# Patient Record
Sex: FEMALE | Race: WHITE | Employment: FULL TIME | URBAN - METROPOLITAN AREA
[De-identification: names, ages, dates, MRNs, and addresses within clinical notes are randomized per-mention and may not be internally consistent; named-entity substitution may affect disease eponyms.]

---

## 2017-05-23 ENCOUNTER — HOSPITAL ENCOUNTER (OUTPATIENT)
Dept: PHYSICAL THERAPY | Age: 22
Discharge: HOME | End: 2017-05-23
Payer: COMMERCIAL

## 2017-05-23 PROCEDURE — 97162 PT EVAL MOD COMPLEX 30 MIN: CPT

## 2017-05-23 PROCEDURE — 97530 THERAPEUTIC ACTIVITIES: CPT

## 2017-05-23 NOTE — PROGRESS NOTES
PT DAILY TREATMENT NOTE     Patient Name: Isma Thrasher  Date:2017  : 1995  [x]  Patient  Verified  Payor: Dale Amanda / Plan: [place] [employer]  [facility] Type: Commerical /    In time: 5:15pm  Out time: 6:02 pm  Total Treatment Time (min): 47  Visit #: 1 of 12    Treatment Area: Sprain of calcaneofibular ligament of right ankle, initial encounter [S93.518A]    SUBJECTIVE  Pain Level (0-10 scale): 0  Any medication changes, allergies to medications, adverse drug reactions, diagnosis change, or new procedure performed?: [x] No    [] Yes (see summary sheet for update)  Subjective functional status/changes:   [] No changes reported    Hx Present Illness: \"Alcohol, 4 steps and a floor length dress, not a good combination. \"  Tripped and fell down the steps  DOI 17  Right ankle sprain, Left foot maira fx  Currently wearing boot on left foot for Miara fx  Went to ER day after - x-rays on right ankle - aircast and crutches x4 days  Went to [employer] [facility] x 5 days - with left foot undiagnosed  Has hx of 3 ankle sprains on right ankle  Currently hoping for non-surgical treatment for Left foot Maira fx   Increase in pain with descending stairs, after walking >1 hour, lifting, turning with lifting   Reports that has \"shooting pain\" into lateral calf at the end of the day  PLOF  Unrestricted and unlimited in daily, work and recreational activities   X-rays     Pain:  _9__/10 max       __0_/10 min     _0___/10 now    Location: indicates on right foot and ankle - anterior lateral ankle      [] Sharp    [] Dull      [] Burning     []  Aching     [] Throbbing      [] Tingling     [x] Other: \"shooting\"       []  Constant                   [x] Intermittent        Previous treatment:   Crutches x4 days, air cast, over the counter brace    PMHX: Significant for: please see past medical hx from     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 \"      OBJECTIVE    Modality rationale:    Min Type Additional Details    [] Estim:  []Unatt       []IFC  []Premod                        []Other:  []w/ice   []w/heat  Position:  Location:    [] Estim: []Att    []TENS instruct  []NMES                    []Other:  []w/US   []w/ice   []w/heat  Position:  Location:    []  Traction: [] Cervical       []Lumbar                       [] Prone          []Supine                       []Intermittent   []Continuous Lbs:  [] before manual  [] after manual    []  Ultrasound: []Continuous   [] Pulsed                           []1MHz   []3MHz W/cm2:  Location:    []  Iontophoresis with dexamethasone         Location: [] Take home patch   [] In clinic    []  Ice     []  heat  []  Ice massage  []  Laser   []  Anodyne Position:  Location:    []  Laser with stim  []  Other:  Position:  Location:    []  Vasopneumatic Device Pressure:       [] lo [] med [] hi   Temperature: [] lo [] med [] hi   [] Skin assessment post-treatment:  []intact []redness- no adverse reaction    []redness - adverse reaction:     35 min [x]Eval                  []Re-Eval             With   [] TE   [x] TA - 12 min   [] neuro   [] other: Patient Education: [x] Review HEP    [] Progressed/Changed HEP based on:   [] positioning   [] body mechanics   [] transfers   [] heat/ice application    [x] other:  Reviewed exam findings, anatomy involved and POC     Other Objective/Functional Measures:    Movement/gait:      Visual Inspection: Thoracic: flattened  Lumbar: WFL, slightly increased   Shoulder/Scapula: left shoulder lower, bilateral scap abducted    Palpation: TTP along right ATFL, CFL, peroneals, anterior ankle, along anterior tib muscle belly   Active trigger points in right medial gastroc and soleus  TTP along achilles                           AROM/PROM Right Left   DF 5* Lacking 1*   PF WFL WFL   IV 25 WFL   EV 18 WFL Strength Right Left   DF 5 NT due to fx   PF  NT due to fx   IV p! NT due to fx   EV 4, p! NT due to fx                 Special Tests Right Left   Anterior Drawer + -   Talar Tilt + -                         Other Right Left   Girth - malleoli 26 cm 25 cm   Figure 8 51.1 cm 51 cm                         Other Comments: Standing [place] Flexion: fingers to floor, no reversal of lordosis,no use of gluts to stand  Gillet: hypomobile bilaterally     Pain Level (0-10 scale) post treatment: 3    ASSESSMENT/Changes in Function:   Pt is a 24 y.o. Female with C/C of right ankle pain. Pt is S/P right ankle inversion sprain with DOI on 4/22/17. At same time pt also sustained a left Emanuel fx. Pt presents to PT with walking boot on left foot and over the counter brace on right ankle. Objective findings include decreased ROM, mild swelling at right ankle, TTP along anterior TC joint, ATFL and peroneal/fibularis muscle bellies and tendonous junction. Pain with talar tilt and anterior drawer test and possible laxity with anterior drawer. Pt could benefit from skilled PT to address proprioception, stability, strength and decrease risk of future reinjury. Patient will continue to benefit from skilled PT services to modify and progress therapeutic interventions, address functional mobility deficits, address ROM deficits, address strength deficits, analyze and address soft tissue restrictions, analyze and cue movement patterns, analyze and modify body mechanics/ergonomics, assess and modify postural abnormalities and instruct in home and community integration to attain remaining goals. [x]  See Plan of Care  []  See progress note/recertification  []  See Discharge Summary         Progress towards goals / Updated goals:  Short Term Goals: STG- To be accomplished in 2 week(s):  1. Pt will be independent with HEP to encourage prophylaxis.   Eval:dispensed  Current: NA    Long Term Goals: LTG- To be accomplished in 6 week(s):  1. Pt will increase right foot and ankle DF AROM to [facility] to facilitate proper gait mechanics. Eval:5*  Current: NA    2. Pt will be able to perform dynamic SL stance activity for 30 sec without pain or error to indicate improved stability. Eval:unable  Current: NA    3. Pt will increase right foot and ankle IV, EV and PF strength to >4/5 MMT to allow pt to perform age appropriate and daily activities   Eval: pain with MMT  Current: NA    4. Pt FOTO score will increase by 12 points to show improvement in function. Eval:53  Current: will address at visit 5      PLAN  [x]  Upgrade activities as tolerated     []  Continue plan of care  []  Update interventions per flow sheet       []  Discharge due to:_  []  Other:_      Weston Coleman, PT, DPT 5/23/2017  5:18 PM    No future appointments.

## 2017-05-23 NOTE — PROGRESS NOTES
[facility] at the [address], [place], [address] [facility]  Phone: [phone]      Fax:  [phone]       Plan of Care/ Statement of Necessity for Physical Therapy Services      Patient name: Conchita Healy Start of Care: 2017   Referral source: Chhaya Cam MD : 1995    Medical Diagnosis: Sprain of calcaneofibular ligament of right ankle, initial encounter [S93.411A]   Onset Date:DOI 17    Treatment Diagnosis:  Right Ankle Sprain   Prior Hospitalization: see medical history Provider#: [id number]   Medications: Verified on Patient summary List    Comorbidities: Fracture Right Foot, HTN, BMI >30   Prior Level of Function: Unrestricted and unlimited in daily, work and recreational activities       The [address] [place] [address] and following information is based on the information from the initial evaluation. Assessment/ key information:   Pt is a 24 y.o. Female with C/C of right ankle pain. Pt is S/P right ankle inversion sprain with DOI on 17. At same time pt also sustained a left Emanuel fx. Pt presents to PT with walking boot on left foot and over the counter brace on right ankle. Objective findings include decreased ROM, mild swelling at right ankle, TTP along anterior TC joint, ATFL and peroneal/fibularis muscle bellies and tendonous junction. Pain with talar tilt and anterior drawer test and possible laxity with anterior drawer. Pt could benefit from skilled PT to address proprioception, stability, strength and decrease risk of future reinjury. Evaluation Complexity History HIGH Complexity :3+ comorbidities / personal factors will impact the outcome/ POC ; Examination HIGH Complexity : 4+ Standardized tests and measures addressing body structure, function, activity limitation and / or participation in recreation  ;Presentation MEDIUM Complexity : Evolving with changing characteristics  ; Clinical Decision Making MEDIUM Complexity : FOTO score of 26-74  Overall Complexity Rating: MEDIUM  Problem List: pain affecting function, decrease ROM, decrease strength, impaired gait/ balance, decrease ADL/ functional abilitiies, decrease activity tolerance and decrease flexibility/ joint mobility   Treatment Plan may include any combination of the following: Therapeutic exercise, Therapeutic activities, Neuromuscular re-education, Physical agent/modality, Gait/balance training, Manual therapy and Patient education  Patient / Family readiness to learn indicated by: asking questions, trying to perform skills and interest  Persons(s) to be included in education: patient (P)  Barriers to Learning/Limitations: None  Patient Goal (s): My ankle to not be weak and for me to not end up having surgery  Patient Self Reported Health Status: good  Rehabilitation Potential: good    Short Term Goals: STG- To be accomplished in 2 week(s):  1. Pt will be independent with HEP to encourage prophylaxis. Eval:dispensed  Current: NA     Long Term Goals: LTG- To be accomplished in 6 week(s):  1. Pt will increase right foot and ankle DF AROM to [facility] to facilitate proper gait mechanics. Eval:5*  Current: NA     2. Pt will be able to perform dynamic SL stance activity for 30 sec without pain or error to indicate improved stability. Eval:unable  Current: NA     3. Pt will increase right foot and ankle IV, EV and PF strength to >4/5 MMT to allow pt to perform age appropriate and daily activities   Eval: pain with MMT  Current: NA     4. Pt FOTO score will increase by 12 points to show improvement in function. Eval:53  Current: will address at visit 5    Frequency / Duration: Patient to be seen 2 times per week for 6 weeks.     Patient/ Caregiver education and instruction: Diagnosis, prognosis, self care, activity modification and exercises   [x]  Plan of care has been reviewed with CJ Wilde, PT, DPT 5/23/2017 7:17 PM  _____________________________________________________________________  I certify that the above Therapy Services are being furnished while the patient is under my care. I agree with the treatment plan and certify that this therapy is necessary.     Physician's Signature:____________________  Date:__________Time:______    Please sign and return to [facility] at the [address] [place], [address] [facility]       Phone: [phone]      Fax:  [phone]

## 2017-05-25 ENCOUNTER — HOSPITAL ENCOUNTER (OUTPATIENT)
Dept: PHYSICAL THERAPY | Age: 22
Discharge: HOME | End: 2017-05-25
Payer: COMMERCIAL

## 2017-05-25 PROCEDURE — 97530 THERAPEUTIC ACTIVITIES: CPT

## 2017-05-25 PROCEDURE — 97016 VASOPNEUMATIC DEVICE THERAPY: CPT

## 2017-05-25 PROCEDURE — 97110 THERAPEUTIC EXERCISES: CPT

## 2017-05-25 PROCEDURE — 97112 NEUROMUSCULAR REEDUCATION: CPT

## 2017-05-25 NOTE — PROGRESS NOTES
PT DAILY TREATMENT NOTE     Patient Name: Conchita Healy  Date:2017  : 1995  [x]  Patient  Verified  Payor: Vanna Benjamin / Plan: [place] [employer]  [facility] PT / Product Type: Commerical /    In time:6:02  Out time:7:13  Total Treatment Time (min): 71  Visit #: 2 of 12    Treatment Area: Sprain of calcaneofibular ligament of right ankle, initial encounter [S90.287Q]    SUBJECTIVE  Pain Level (0-10 scale): 2/10  Any medication changes, allergies to medications, adverse drug reactions, diagnosis change, or new procedure performed?: [x] No    [] Yes (see summary sheet for update)  Subjective functional status/changes:   [] No changes reported  \"I have pain in my ankle. It doesn't hurt bad when I'm just standing on it. \"    OBJECTIVE    Modality rationale: decrease edema, decrease inflammation and decrease pain to improve the patients ability to perform ADLs with less pain.     Min Type Additional Details    [] Estim:  []Unatt       []IFC  []Premod                        []Other:  []w/ice   []w/heat  Position:  Location:    [] Estim: []Att    []TENS instruct  []NMES                    []Other:  []w/US   []w/ice   []w/heat  Position:  Location:    []  Traction: [] Cervical       []Lumbar                       [] Prone          []Supine                       []Intermittent   []Continuous Lbs:  [] before manual  [] after manual    []  Ultrasound: []Continuous   [] Pulsed                           []1MHz   []3MHz W/cm2:  Location:    []  Iontophoresis with dexamethasone         Location: [] Take home patch   [] In clinic    []  Ice     []  heat  []  Ice massage  []  Laser   []  Anodyne Position:  Location:    []  Laser with stim  []  Other:  Position:  Location:   10 [x]  Vasopneumatic Device Pressure:       [] lo [x] med [] hi   Temperature: [x] lo [] med [] hi   [x] Skin assessment post-treatment:  [x]intact []redness- no adverse reaction    []redness - adverse reaction:       30 min Therapeutic Exercise:  [x] See flow sheet :   Rationale: increase ROM, increase strength and improve coordination to improve the patients ability to perform ADLs with less pain. 21 min Therapeutic Activity:  [x]  See flow sheet :   Rationale: increase ROM and improve coordination  to improve the patients ability to   perform ADLs with less pain. 10 min Neuromuscular Re-education:  [x]  See flow sheet :   Rationale: increase ROM, increase strength and improve coordination  to improve the patients ability to perform ADLs with less pain. With   [] TE   [] TA   [] neuro   [] other: Patient Education: [x] Review HEP    [] Progressed/Changed HEP based on:   [] positioning   [] body mechanics   [] transfers   [] heat/ice application    [] other:      Other Objective/Functional Measures:      Pain Level (0-10 scale) post treatment: 1/10    ASSESSMENT/Changes in Function: Pt had increased pain and discomfort with ex, especially standing ex but not above tolerance. Pt greatly limited with inversion on BAPS board. Pt is unable reach inversion angle required for L2 on baps board. Patient will continue to benefit from skilled PT services to modify and progress therapeutic interventions, address functional mobility deficits, address ROM deficits, address strength deficits, analyze and cue movement patterns and assess and modify postural abnormalities to attain remaining goals. []  See Plan of Care  []  See progress note/recertification  []  See Discharge Summary         Progress towards goals / Updated goals:  Short Term Goals: STG- To be accomplished in 2 week(s):  1. Pt will be independent with HEP to encourage prophylaxis. Eval:dispensed  Current: NA      Long Term Goals: LTG- To be accomplished in 6 week(s):  1. Pt will increase right foot and ankle DF AROM to [facility] to facilitate proper gait mechanics. Eval:5*  Current: NA      2.  Pt will be able to perform dynamic SL stance activity for 30 sec without pain or error to indicate improved stability. Eval:unable  Current: NA      3. Pt will increase right foot and ankle IV, EV and PF strength to >4/5 MMT to allow pt to perform age appropriate and daily activities   Eval: pain with MMT  Current: NA      4. Pt FOTO score will increase by 12 points to show improvement in function.   Eval:53  Current: will address at visit 5    PLAN  []  Upgrade activities as tolerated     []  Continue plan of care  []  Update interventions per flow sheet       []  Discharge due to:_  []  Other:_      Latricia Rod 5/25/2017  7:40 PM    Future Appointments  Date Time Provider [place]   5/30/2017 5:30 PM Hilary Bateman, PT, DPT [facility]   6/1/2017 6:00 PM Hilary Bateman, PT, DPT [facility]

## 2017-05-30 ENCOUNTER — APPOINTMENT (OUTPATIENT)
Dept: PHYSICAL THERAPY | Age: 22
End: 2017-05-30
Payer: COMMERCIAL

## 2017-06-01 ENCOUNTER — APPOINTMENT (OUTPATIENT)
Dept: PHYSICAL THERAPY | Age: 22
End: 2017-06-01

## 2017-07-06 NOTE — PROGRESS NOTES
[facility] at the [address], [place], [address] [facility]  Phone: [phone]      Fax:  [phone]    Discharge Summary    Patient name: Christina Reyes     Start of Care: 17  Referral source: Stanislaw Black MD    : 1995  Medical/Treatment Diagnosis: Sprain of calcaneofibular ligament of right ankle, initial encounter [S93.411A]  Onset Date:DOA 17  Prior Hospitalization: see medical history   Provider#: [id number]   Comorbidities: Fracture Right Foot, HTN, BMI >30   Prior Level of Function: Unrestricted and unlimited in daily, work and recreational activities       Medications: Verified on Patient Summary List    Vis[facility] from [facility]: 2    Missed Visits: 2  Reporting Period : 17 to 17      Assessment/ Summary of Care: Pt attended 2 PT appointments (including IE) to address right ankle pain. Pt then CA 2 remaining appointments. No progress toward goals due to limited attendance. Due to abdication, pt to be discharged at this time.     RECOMMENDATIONS:  [x]Discontinue therapy: []Patient has reached or is progressing toward set goals      [x]Patient is non-compliant or has abdicated      []Due to lack of appreciable progress towards set goals    Arlene Lan, PT 2017 2:32 PM